# Patient Record
Sex: FEMALE | Race: WHITE | NOT HISPANIC OR LATINO | URBAN - METROPOLITAN AREA
[De-identification: names, ages, dates, MRNs, and addresses within clinical notes are randomized per-mention and may not be internally consistent; named-entity substitution may affect disease eponyms.]

---

## 2018-01-23 ENCOUNTER — APPOINTMENT (OUTPATIENT)
Age: 73
Setting detail: DERMATOLOGY
End: 2018-01-23

## 2018-01-23 VITALS
HEART RATE: 85 BPM | WEIGHT: 160 LBS | DIASTOLIC BLOOD PRESSURE: 82 MMHG | HEIGHT: 60 IN | SYSTOLIC BLOOD PRESSURE: 139 MMHG

## 2018-01-23 DIAGNOSIS — L82.0 INFLAMED SEBORRHEIC KERATOSIS: ICD-10-CM

## 2018-01-23 PROCEDURE — OTHER LIQUID NITROGEN: OTHER

## 2018-01-23 PROCEDURE — OTHER COUNSELING: OTHER

## 2018-01-23 PROCEDURE — OTHER MIPS QUALITY: OTHER

## 2018-01-23 PROCEDURE — 17110 DESTRUCT B9 LESION 1-14: CPT

## 2018-01-23 ASSESSMENT — LOCATION DETAILED DESCRIPTION DERM: LOCATION DETAILED: LEFT MID-UPPER BACK

## 2018-01-23 ASSESSMENT — LOCATION ZONE DERM: LOCATION ZONE: TRUNK

## 2018-01-23 ASSESSMENT — LOCATION SIMPLE DESCRIPTION DERM: LOCATION SIMPLE: LEFT UPPER BACK

## 2018-01-23 NOTE — HPI: SKIN LESION
How Severe Is Your Skin Lesion?: mild
Has Your Skin Lesion Been Treated?: not been treated
Is This A New Presentation, Or A Follow-Up?: Skin Lesion
Additional History: Pain 0/10

## 2018-01-23 NOTE — PROCEDURE: LIQUID NITROGEN
Post-Care Instructions: I reviewed with the patient in detail post-care instructions. Patient is to wear sunprotection, and avoid picking at any of the treated lesions. Patient may apply Vaseline to crusted or scabbing areas.
Render Post-Care Instructions In Note?: no
Consent: The patient's (or patient's guardian's) consent was obtained including but not limited to risks of crusting, scabbing, blistering, scarring, nerve damage, darker or lighter pigmentary change, recurrence, incomplete removal and infection.
Number Of Freeze-Thaw Cycles: 2 freeze-thaw cycles
Medical Necessity Clause: This procedure was medically necessary because the lesions that were treated were:
Detail Level: Simple
Medical Necessity Information: It is in your best interest to select a reason for this procedure from the list below. All of these items fulfill various CMS LCD requirements except the new and changing color options.
Duration Of Freeze Thaw-Cycle (Seconds): 5

## 2018-02-20 ENCOUNTER — APPOINTMENT (OUTPATIENT)
Age: 73
Setting detail: DERMATOLOGY
End: 2018-02-21

## 2018-02-20 VITALS
DIASTOLIC BLOOD PRESSURE: 63 MMHG | WEIGHT: 159 LBS | HEIGHT: 60 IN | HEART RATE: 90 BPM | SYSTOLIC BLOOD PRESSURE: 96 MMHG

## 2018-02-20 DIAGNOSIS — L82.0 INFLAMED SEBORRHEIC KERATOSIS: ICD-10-CM

## 2018-02-20 PROCEDURE — OTHER MIPS QUALITY: OTHER

## 2018-02-20 PROCEDURE — OTHER COUNSELING: OTHER

## 2018-02-20 PROCEDURE — OTHER LIQUID NITROGEN: OTHER

## 2018-02-20 PROCEDURE — 17110 DESTRUCT B9 LESION 1-14: CPT

## 2018-02-20 ASSESSMENT — LOCATION ZONE DERM: LOCATION ZONE: TRUNK

## 2018-02-20 ASSESSMENT — LOCATION SIMPLE DESCRIPTION DERM: LOCATION SIMPLE: LEFT UPPER BACK

## 2018-02-20 ASSESSMENT — LOCATION DETAILED DESCRIPTION DERM: LOCATION DETAILED: LEFT MID-UPPER BACK
